# Patient Record
Sex: FEMALE | Race: WHITE | NOT HISPANIC OR LATINO | Employment: OTHER | URBAN - METROPOLITAN AREA
[De-identification: names, ages, dates, MRNs, and addresses within clinical notes are randomized per-mention and may not be internally consistent; named-entity substitution may affect disease eponyms.]

---

## 2022-12-05 ENCOUNTER — TELEPHONE (OUTPATIENT)
Dept: GASTROENTEROLOGY | Facility: CLINIC | Age: 32
End: 2022-12-05

## 2022-12-05 ENCOUNTER — OFFICE VISIT (OUTPATIENT)
Dept: GASTROENTEROLOGY | Facility: CLINIC | Age: 32
End: 2022-12-05
Payer: COMMERCIAL

## 2022-12-05 VITALS
SYSTOLIC BLOOD PRESSURE: 114 MMHG | OXYGEN SATURATION: 98 % | HEART RATE: 97 BPM | TEMPERATURE: 99 F | WEIGHT: 105.19 LBS | BODY MASS INDEX: 16.51 KG/M2 | DIASTOLIC BLOOD PRESSURE: 76 MMHG | HEIGHT: 67 IN

## 2022-12-05 DIAGNOSIS — R63.4 WEIGHT LOSS, UNINTENTIONAL: ICD-10-CM

## 2022-12-05 DIAGNOSIS — Z86.19 HISTORY OF HELICOBACTER PYLORI INFECTION: ICD-10-CM

## 2022-12-05 DIAGNOSIS — K59.04 CHRONIC IDIOPATHIC CONSTIPATION: Primary | ICD-10-CM

## 2022-12-05 DIAGNOSIS — Z80.0 FAMILY HISTORY OF COLON CANCER: ICD-10-CM

## 2022-12-05 DIAGNOSIS — R10.31 RIGHT LOWER QUADRANT PAIN: ICD-10-CM

## 2022-12-05 DIAGNOSIS — R11.0 NAUSEA: ICD-10-CM

## 2022-12-05 PROCEDURE — 1159F MED LIST DOCD IN RCRD: CPT | Mod: CPTII,S$GLB,, | Performed by: INTERNAL MEDICINE

## 2022-12-05 PROCEDURE — 1160F RVW MEDS BY RX/DR IN RCRD: CPT | Mod: CPTII,S$GLB,, | Performed by: INTERNAL MEDICINE

## 2022-12-05 PROCEDURE — 1160F PR REVIEW ALL MEDS BY PRESCRIBER/CLIN PHARMACIST DOCUMENTED: ICD-10-PCS | Mod: CPTII,S$GLB,, | Performed by: INTERNAL MEDICINE

## 2022-12-05 PROCEDURE — 3078F PR MOST RECENT DIASTOLIC BLOOD PRESSURE < 80 MM HG: ICD-10-PCS | Mod: CPTII,S$GLB,, | Performed by: INTERNAL MEDICINE

## 2022-12-05 PROCEDURE — 99204 PR OFFICE/OUTPT VISIT, NEW, LEVL IV, 45-59 MIN: ICD-10-PCS | Mod: S$GLB,,, | Performed by: INTERNAL MEDICINE

## 2022-12-05 PROCEDURE — 99204 OFFICE O/P NEW MOD 45 MIN: CPT | Mod: S$GLB,,, | Performed by: INTERNAL MEDICINE

## 2022-12-05 PROCEDURE — 3074F SYST BP LT 130 MM HG: CPT | Mod: CPTII,S$GLB,, | Performed by: INTERNAL MEDICINE

## 2022-12-05 PROCEDURE — 3008F PR BODY MASS INDEX (BMI) DOCUMENTED: ICD-10-PCS | Mod: CPTII,S$GLB,, | Performed by: INTERNAL MEDICINE

## 2022-12-05 PROCEDURE — 3074F PR MOST RECENT SYSTOLIC BLOOD PRESSURE < 130 MM HG: ICD-10-PCS | Mod: CPTII,S$GLB,, | Performed by: INTERNAL MEDICINE

## 2022-12-05 PROCEDURE — 1159F PR MEDICATION LIST DOCUMENTED IN MEDICAL RECORD: ICD-10-PCS | Mod: CPTII,S$GLB,, | Performed by: INTERNAL MEDICINE

## 2022-12-05 PROCEDURE — 3078F DIAST BP <80 MM HG: CPT | Mod: CPTII,S$GLB,, | Performed by: INTERNAL MEDICINE

## 2022-12-05 PROCEDURE — 3008F BODY MASS INDEX DOCD: CPT | Mod: CPTII,S$GLB,, | Performed by: INTERNAL MEDICINE

## 2022-12-05 RX ORDER — RISPERIDONE 2 MG/1
2 TABLET ORAL 2 TIMES DAILY
COMMUNITY
Start: 2022-09-29

## 2022-12-05 RX ORDER — SOD SULF/POT CHLORIDE/MAG SULF 1.479 G
12 TABLET ORAL DAILY
Qty: 24 TABLET | Refills: 0 | Status: SHIPPED | OUTPATIENT
Start: 2022-12-05 | End: 2023-07-17

## 2022-12-05 RX ORDER — DEXTROAMPHETAMINE SACCHARATE, AMPHETAMINE ASPARTATE, DEXTROAMPHETAMINE SULFATE AND AMPHETAMINE SULFATE 7.5; 7.5; 7.5; 7.5 MG/1; MG/1; MG/1; MG/1
30 TABLET ORAL 2 TIMES DAILY
COMMUNITY
Start: 2022-11-03

## 2022-12-05 NOTE — PROGRESS NOTES
Clinic Note    Reason for visit:  The primary encounter diagnosis was Chronic idiopathic constipation. Diagnoses of History of Helicobacter pylori infection, Weight loss, unintentional, Nausea, Right lower quadrant pain, and Family history of colon cancer were also pertinent to this visit.    PCP: Grady Cotton   [address] / [place] [address]    HPI:  This is a 32 y.o. female who is here to establish care. Patient reports chronic constipation as long as she can remember, may go 2-3 weeks without a BM. She will have a lot of lower abdominal pain if constipation worse. She has tried Miralax in past for constipation and then tried Linzess ?290 and states she will have a BM but seemed to stop working as well so she only takes as needed. Patient also reports intermittent BRBPR, but has to strain with BMs at times and attributes bleeding to hemorrhoids. She has had some weight loss since 2020 as well but right now is somewhat stable. She weighed around 126# in 2020, then went down to 98# and now up to 105. She has not changed her diet at all and is eating big portion of food. She states she had EGD many years ago and told had H. Pylori and did treatment for this (amox, Yury treated her, triple therapy, about 2015). Denies reflux, dysphagia or frequent NSAID use. Smokes half pack of cigarettes daily. She reports having intermittent nausea and fatigue since weight loss etc started. No vomiting. The nausea comes and goes, may not last a full day. No prior colonoscopy. No recent abd imaging. FH of colon cancer in paternal aunt at age 45, paternal uncle around age 46, and paternal grandfather.     From ed referral 3/2021 CT AP IV large amount of gastric contents in stomach     present during OV.    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  The in the process of trying to find a primary.  She has seen dermatology.  She is getting additional blood work to it.  Her  B12 was found to be above normal range and she does not take B12 supplements.  No prenatal vitamin.  No multivitamin.    Review of Systems   Constitutional:  Positive for chills, diaphoresis, fatigue and unexpected weight change. Negative for fever.   HENT:  Positive for postnasal drip. Negative for mouth sores, nosebleeds, sore throat, trouble swallowing and voice change.    Eyes:  Positive for pain and eye dryness. Negative for discharge.   Respiratory:  Negative for apnea, cough, choking, chest tightness, shortness of breath and wheezing.    Cardiovascular:  Negative for chest pain, palpitations, leg swelling and claudication.   Gastrointestinal:  Positive for abdominal distention, abdominal pain, anal bleeding, blood in stool, change in bowel habit, constipation, nausea, rectal pain and change in bowel habit. Negative for diarrhea, vomiting, reflux and fecal incontinence.   Genitourinary:  Negative for bladder incontinence, difficulty urinating, dysuria, flank pain, frequency and hematuria.   Musculoskeletal:  Positive for back pain. Negative for arthralgias, joint swelling and joint deformity.   Integumentary:  Negative for color change, rash and wound.   Allergic/Immunologic: Positive for environmental allergies. Negative for food allergies.   Neurological:  Positive for weakness and headaches. Negative for seizures, facial asymmetry, speech difficulty and memory loss.   Hematological:  Negative for adenopathy. Bruises/bleeds easily.   Psychiatric/Behavioral:  Positive for agitation. Negative for behavioral problems, confusion, hallucinations and sleep disturbance.       Past Medical History:   Diagnosis Date    ADHD     Chronic constipation     H. pylori infection     Melanoma     eye, attempting to find primary    Rectal bleed     Unspecified hemorrhoids     Weight loss      Past Surgical History:   Procedure Laterality Date    AUGMENTATION OF BREAST      TONSILLECTOMY AND ADENOIDECTOMY       Family History  "  Problem Relation Age of Onset    Stomach cancer Maternal Grandmother     Stomach cancer Maternal Grandfather     Stomach cancer Paternal Grandmother     Colon cancer Paternal Grandfather     Irritable bowel syndrome Maternal Uncle     Colon cancer Paternal Aunt 45    Colon cancer Paternal Uncle 46    Pancreatic cancer Neg Hx     Liver cancer Neg Hx     Liver disease Neg Hx     Esophageal cancer Neg Hx     Throat cancer Neg Hx     Ulcerative colitis Neg Hx     Crohn's disease Neg Hx      Social History     Tobacco Use    Smoking status: Every Day     Packs/day: 0.50     Types: Cigarettes    Smokeless tobacco: Never   Substance Use Topics    Alcohol use: Yes     Alcohol/week: 6.0 standard drinks     Types: 6 Cans of beer per week    Drug use: Never     Review of patient's allergies indicates:  No Known Allergies     Medication List with Changes/Refills   New Medications    SOD SULF-POT CHLORIDE-MAG SULF (SUTAB) 1.479-0.188- 0.225 GRAM TABLET    Take 12 tablets by mouth once daily. Take according to package instructions with indicated amount of water. No breakfast day before test. May substitute with Suprep, Clenpiq, Plenvu, Moviprep or GoLytely based on Rx plan and patient preference.   Current Medications    DEXTROAMPHETAMINE-AMPHETAMINE 30 MG TAB    Take 30 mg by mouth 2 (two) times a day.    RISPERIDONE (RISPERDAL) 2 MG TABLET    Take 2 mg by mouth 2 (two) times a day.         Vital Signs:  /76   Pulse 97   Temp 98.5 °F (36.9 °C)   Ht 5' 7" (1.702 m)   Wt 47.7 kg (105 lb 3.2 oz)   SpO2 98%   BMI 16.48 kg/m²         Physical Exam  Vitals reviewed.   Constitutional:       General: She is awake. She is not in acute distress.     Appearance: Normal appearance. She is well-developed. She is not ill-appearing, toxic-appearing or diaphoretic.   HENT:      Head: Normocephalic and atraumatic.      Nose: Nose normal.      Mouth/Throat:      Mouth: Mucous membranes are moist.      Pharynx: Oropharynx is clear. " No oropharyngeal exudate or posterior oropharyngeal erythema.   Eyes:      General: Lids are normal. Gaze aligned appropriately. No scleral icterus.        Right eye: No discharge.         Left eye: No discharge.      Extraocular Movements: Extraocular movements intact.      Conjunctiva/sclera: Conjunctivae normal.   Neck:      Trachea: Trachea normal.   Cardiovascular:      Rate and Rhythm: Normal rate and regular rhythm.      Pulses:           Radial pulses are 2+ on the right side and 2+ on the left side.   Pulmonary:      Effort: Pulmonary effort is normal. No respiratory distress.      Breath sounds: Normal breath sounds. No stridor. No wheezing or rhonchi.   Chest:      Chest wall: No tenderness.   Abdominal:      General: Bowel sounds are normal. There is no distension.      Palpations: Abdomen is soft. There is no fluid wave, hepatomegaly or mass.      Tenderness: There is no abdominal tenderness. There is no guarding or rebound.   Musculoskeletal:         General: No tenderness or deformity.      Cervical back: Full passive range of motion without pain and neck supple. No tenderness.      Right lower leg: No edema.      Left lower leg: No edema.   Lymphadenopathy:      Cervical: No cervical adenopathy.   Skin:     General: Skin is warm and dry.      Capillary Refill: Capillary refill takes less than 2 seconds.      Coloration: Skin is not cyanotic, jaundiced or pale.      Findings: No rash.   Neurological:      General: No focal deficit present.      Mental Status: She is alert and oriented to person, place, and time.      Cranial Nerves: No facial asymmetry.      Motor: No tremor.   Psychiatric:         Attention and Perception: Attention normal.         Mood and Affect: Mood and affect normal.         Speech: Speech normal.         Behavior: Behavior normal. Behavior is cooperative.          All of the data above and below has been reviewed by myself and any further interpretations will be reflected in  the assessment and plan.   The data includes review of external notes, and independent interpretation of lab results, procedures, x-rays, and imaging reports.      Assessment:  Chronic idiopathic constipation  -     Ambulatory Referral to External Surgery    History of Helicobacter pylori infection  -     Ambulatory Referral to External Surgery    Weight loss, unintentional  -     Ambulatory Referral to External Surgery    Nausea    Right lower quadrant pain    Family history of colon cancer    Other orders  -     sod sulf-pot chloride-mag sulf (SUTAB) 1.479-0.188- 0.225 gram tablet; Take 12 tablets by mouth once daily. Take according to package instructions with indicated amount of water. No breakfast day before test. May substitute with Suprep, Clenpiq, Plenvu, Moviprep or GoLytely based on Rx plan and patient preference.  Dispense: 24 tablet; Refill: 0    Plan for EGD/Colonoscopy with gastric biopsies to rule out H. Pylori.   Trial of Trulance. Samples given. Will focus on establishing good BM regimen to see if nausea improves as well.   10/2022 [facility].     Recommendations:  Schedule upper and lower endoscopies.   Begin taking Trulance 3 mg daily for constipation and notify us if this works well for you.     Risks, benefits, and alternatives of medical management, any associated procedures, and/or treatment discussed with the patient. Patient given opportunity to ask questions and voices understanding. Patient has elected to proceed with the recommended care modalities as discussed.    Follow up in about 6 months (around 6/5/2023).    Order summary:  Orders Placed This Encounter   Procedures    Ambulatory Referral to External Surgery          Instructed patient to notify my office if they have not been contacted within two weeks after any procedures, submitting any samples (biopsies, blood, stool, urine, etc.) or after any imaging (X-ray, CT, MRI, etc.).     Jennifer Garcia MD    This document may have been created  using a voice recognition transcribing system. Incorrect words or phrases may have been missed during proofreading. Please interpret accordingly or contact me for clarification.

## 2022-12-05 NOTE — LETTER
December 5, 2022        Grady Cotton MD  [facility] [address] [place] [address]             [facility]  [address] [place] [address]  Phone: [phone]  Fax: [phone]   Patient: Faiza Tony   MR Number: [id number]   YOB: 1990   Date of Visit: 12/5/2022       Dear Dr. Cotton:    Thank you for referring Faiza Tony to me for evaluation. Attached you will find relevant portions of my assessment and plan of care.    If you have questions, please do not hesitate to call me. I look forward to following Faiza Tony along with you.    Sincerely,      Jennifer Garcia MD            CC  No Recipients    Enclosure

## 2022-12-05 NOTE — PATIENT INSTRUCTIONS
Schedule upper and lower endoscopies.   Begin taking Trulance 3 mg daily for constipation and notify us if this works well for you.     Please notify my office if you have not been contacted within two weeks after any procedures, submitting any samples (biopsies, blood, stool, urine, etc.) or after any imaging (X-ray, CT, MRI, etc.).

## 2022-12-05 NOTE — LETTER
December 5, 2022        Grady Cotton MD  [address]  [address]  [facility]  [place] [address]             [facility]  [address] [place] [address]  Phone: [phone]  Fax: [phone]   Patient: Faiza Tony   MR Number: [id number]   YOB: 1990   Date of Visit: 12/5/2022       Dear Dr. Cotton:    Thank you for referring Faiza Tony to me for evaluation. Attached you will find relevant portions of my assessment and plan of care.    If you have questions, please do not hesitate to call me. I look forward to following Faiza Tony along with you.    Sincerely,      Jennifer Garcia MD            CC  No Recipients    Enclosure

## 2022-12-05 NOTE — TELEPHONE ENCOUNTER
"[facility]  [address] [place] [address]  Phone: [phone]  Fax: [phone]    History & Physical         Provider: Dr. Jennifer Garcia    Patient Name: Faiza MOREL (age):1990  32 y.o.           Gender: female   Phone: [phone]     Referring Physician: Grady Cotton     Vital Signs:   Height - 5' 7"  Weight - 105 lbs  BMI -  16.45    Plan: EGD/Colonoscopy    Encounter Diagnoses   Name Primary?    Chronic idiopathic constipation Yes    History of Helicobacter pylori infection     Weight loss, unintentional            History:      Past Medical History:   Diagnosis Date    ADHD     Chronic constipation     H. pylori infection     Melanoma     eye, attempting to find primary    Rectal bleed     Unspecified hemorrhoids     Weight loss       Past Surgical History:   Procedure Laterality Date    AUGMENTATION OF BREAST      TONSILLECTOMY AND ADENOIDECTOMY        Medication List with Changes/Refills   Current Medications    DEXTROAMPHETAMINE-AMPHETAMINE 30 MG TAB    Take 30 mg by mouth 2 (two) times a day.    RISPERIDONE (RISPERDAL) 2 MG TABLET    Take 2 mg by mouth 2 (two) times a day.    SOD SULF-POT CHLORIDE-MAG SULF (SUTAB) 1.479-0.188- 0.225 GRAM TABLET    Take 12 tablets by mouth once daily. Take according to package instructions with indicated amount of water. No breakfast day before test. May substitute with Suprep, Clenpiq, Plenvu, Moviprep or GoLytely based on Rx plan and patient preference.      Review of patient's allergies indicates:  No Known Allergies   Family History   Problem Relation Age of Onset    Stomach cancer Maternal Grandmother     Stomach cancer Maternal Grandfather     Stomach cancer Paternal Grandmother     Colon cancer Paternal Grandfather     Irritable bowel syndrome Maternal Uncle     Colon cancer Paternal Aunt 45    Colon cancer Paternal Uncle 46    Pancreatic " cancer Neg Hx     Liver cancer Neg Hx     Liver disease Neg Hx     Esophageal cancer Neg Hx     Throat cancer Neg Hx     Ulcerative colitis Neg Hx     Crohn's disease Neg Hx       Social History     Tobacco Use    Smoking status: Every Day     Packs/day: 0.50     Types: Cigarettes    Smokeless tobacco: Never   Substance Use Topics    Alcohol use: Yes     Alcohol/week: 6.0 standard drinks     Types: 6 Cans of beer per week    Drug use: Never        Physical Examination:     General Appearance:___________________________  HEENT: _____________________________________  Abdomen:____________________________________  Heart:________________________________________  Lungs:_______________________________________  Extremities:___________________________________  Skin:_________________________________________  Endocrine:____________________________________  Genitourinary:_________________________________  Neurological:__________________________________      Patient has been evaluated immediately prior to sedation and is medically cleared for endoscopy with IVCS as an ASA class: ______      Physician Signature: _________________________       Date: ________  Time: ________

## 2023-02-27 ENCOUNTER — TELEPHONE (OUTPATIENT)
Dept: ADMINISTRATIVE | Facility: CLINIC | Age: 33
End: 2023-02-27
Payer: COMMERCIAL

## 2023-03-06 ENCOUNTER — OUTSIDE PLACE OF SERVICE (OUTPATIENT)
Dept: GASTROENTEROLOGY | Facility: CLINIC | Age: 33
End: 2023-03-06
Payer: COMMERCIAL

## 2023-03-06 LAB — CRC RECOMMENDATION EXT: NORMAL

## 2023-03-06 PROCEDURE — 45378 DIAGNOSTIC COLONOSCOPY: CPT | Mod: ,,, | Performed by: INTERNAL MEDICINE

## 2023-03-06 PROCEDURE — 45378 PR COLONOSCOPY,DIAGNOSTIC: ICD-10-PCS | Mod: ,,, | Performed by: INTERNAL MEDICINE

## 2023-03-06 PROCEDURE — 43239 PR EGD, FLEX, W/BIOPSY, SGL/MULTI: ICD-10-PCS | Mod: 51,,, | Performed by: INTERNAL MEDICINE

## 2023-03-06 PROCEDURE — 43239 EGD BIOPSY SINGLE/MULTIPLE: CPT | Mod: 51,,, | Performed by: INTERNAL MEDICINE

## 2023-03-12 ENCOUNTER — TELEPHONE (OUTPATIENT)
Dept: GASTROENTEROLOGY | Facility: CLINIC | Age: 33
End: 2023-03-12
Payer: COMMERCIAL

## 2023-03-12 DIAGNOSIS — K29.80 DUODENITIS DETERMINED BY BIOPSY: Primary | ICD-10-CM

## 2023-03-12 NOTE — TELEPHONE ENCOUNTER
DBx patchy mild chr ditis, GBx react w/o Hp, GEBx reflux, repeat colonoscopy in 5 years.     Update in Health Maintenance section of Epic.  Notify patient. No signs of infection or precancerous cells on the upper endoscopy biopsies.  No. H. Pylori.  Prior CT AP IV looked well.  Did the Trulance samples given during her OV work well for her? Rec GES if having any persistent GI Sx.  NBP

## 2023-03-12 NOTE — TELEPHONE ENCOUNTER
She also had some mild inflammation of the small bowel. Rec we check her blood work for signs of Celiac disease. Orders signed.  NBP

## 2023-03-14 ENCOUNTER — TELEPHONE (OUTPATIENT)
Dept: GASTROENTEROLOGY | Facility: CLINIC | Age: 33
End: 2023-03-14
Payer: COMMERCIAL

## 2023-03-14 NOTE — TELEPHONE ENCOUNTER
I spoke to patient and gave results and is going to get blood work done at the path lab in [place].     ----- Message from Terra Mas sent at 3/14/2023  9:21 AM CDT -----  Contact: self  Type:  Patient Returning Call    Who Called: Faiza Tony   Who Left Message for Patient: Mariaa   Does the patient know what this is regarding?: Yes, results   Would the patient rather a call back or a response via [facility]? Call back   Best Call Back Number:[phone]   Additional Information: No answer at extensions

## 2023-03-15 NOTE — TELEPHONE ENCOUNTER
March 14, 2023  Margot Borrego LPN     [place]     9:48 AM  Note  I spoke to patient and gave results and is going to get blood work done at the path lab in [place].     ----- Message from Terra Mas sent at 3/14/2023  9:21 AM CDT -----  Contact: self  Type:  Patient Returning Call     Who Called: Faiza Tony   Who Left Message for Patient: Mariaa   Does the patient know what this is regarding?: Yes, results   Would the patient rather a call back or a response via [facility]? Call back   Best Call Back Number:[phone]

## 2023-04-26 ENCOUNTER — DOCUMENTATION ONLY (OUTPATIENT)
Dept: GASTROENTEROLOGY | Facility: CLINIC | Age: 33
End: 2023-04-26
Payer: COMMERCIAL

## 2023-07-17 ENCOUNTER — OFFICE VISIT (OUTPATIENT)
Dept: GASTROENTEROLOGY | Facility: CLINIC | Age: 33
End: 2023-07-17
Payer: COMMERCIAL

## 2023-07-17 ENCOUNTER — TELEPHONE (OUTPATIENT)
Dept: GASTROENTEROLOGY | Facility: CLINIC | Age: 33
End: 2023-07-17

## 2023-07-17 VITALS
DIASTOLIC BLOOD PRESSURE: 71 MMHG | HEIGHT: 67 IN | BODY MASS INDEX: 16.95 KG/M2 | WEIGHT: 108 LBS | OXYGEN SATURATION: 99 % | HEART RATE: 72 BPM | SYSTOLIC BLOOD PRESSURE: 116 MMHG

## 2023-07-17 DIAGNOSIS — K29.80 DUODENITIS DETERMINED BY BIOPSY: ICD-10-CM

## 2023-07-17 DIAGNOSIS — Z80.0 FAMILY HISTORY OF COLON CANCER: ICD-10-CM

## 2023-07-17 DIAGNOSIS — Z86.19 HISTORY OF HELICOBACTER PYLORI INFECTION: ICD-10-CM

## 2023-07-17 DIAGNOSIS — R11.0 NAUSEA: ICD-10-CM

## 2023-07-17 DIAGNOSIS — K59.04 CHRONIC IDIOPATHIC CONSTIPATION: Primary | ICD-10-CM

## 2023-07-17 PROCEDURE — 1159F MED LIST DOCD IN RCRD: CPT | Mod: CPTII,S$GLB,, | Performed by: INTERNAL MEDICINE

## 2023-07-17 PROCEDURE — 99213 OFFICE O/P EST LOW 20 MIN: CPT | Mod: S$GLB,,, | Performed by: INTERNAL MEDICINE

## 2023-07-17 PROCEDURE — 3008F BODY MASS INDEX DOCD: CPT | Mod: CPTII,S$GLB,, | Performed by: INTERNAL MEDICINE

## 2023-07-17 PROCEDURE — 3078F DIAST BP <80 MM HG: CPT | Mod: CPTII,S$GLB,, | Performed by: INTERNAL MEDICINE

## 2023-07-17 PROCEDURE — 1160F RVW MEDS BY RX/DR IN RCRD: CPT | Mod: CPTII,S$GLB,, | Performed by: INTERNAL MEDICINE

## 2023-07-17 PROCEDURE — 3074F PR MOST RECENT SYSTOLIC BLOOD PRESSURE < 130 MM HG: ICD-10-PCS | Mod: CPTII,S$GLB,, | Performed by: INTERNAL MEDICINE

## 2023-07-17 PROCEDURE — 3074F SYST BP LT 130 MM HG: CPT | Mod: CPTII,S$GLB,, | Performed by: INTERNAL MEDICINE

## 2023-07-17 PROCEDURE — 3008F PR BODY MASS INDEX (BMI) DOCUMENTED: ICD-10-PCS | Mod: CPTII,S$GLB,, | Performed by: INTERNAL MEDICINE

## 2023-07-17 PROCEDURE — 3078F PR MOST RECENT DIASTOLIC BLOOD PRESSURE < 80 MM HG: ICD-10-PCS | Mod: CPTII,S$GLB,, | Performed by: INTERNAL MEDICINE

## 2023-07-17 PROCEDURE — 99213 PR OFFICE/OUTPT VISIT, EST, LEVL III, 20-29 MIN: ICD-10-PCS | Mod: S$GLB,,, | Performed by: INTERNAL MEDICINE

## 2023-07-17 PROCEDURE — 1159F PR MEDICATION LIST DOCUMENTED IN MEDICAL RECORD: ICD-10-PCS | Mod: CPTII,S$GLB,, | Performed by: INTERNAL MEDICINE

## 2023-07-17 PROCEDURE — 1160F PR REVIEW ALL MEDS BY PRESCRIBER/CLIN PHARMACIST DOCUMENTED: ICD-10-PCS | Mod: CPTII,S$GLB,, | Performed by: INTERNAL MEDICINE

## 2023-07-17 RX ORDER — PLECANATIDE 3 MG/1
3 TABLET ORAL DAILY
Qty: 90 TABLET | Refills: 4 | Status: SHIPPED | OUTPATIENT
Start: 2023-07-17

## 2023-07-17 NOTE — LETTER
July 17, 2023        Grady Cotton MD  [address] [facility] [address]  [address]  [facility]  [place] [address]             [facility]  [address] [place] [address]  Phone: [phone]  Fax: [phone]   Patient: Faiza Tony   MR Number: [id number]   YOB: 1990   Date of Visit: 7/17/2023       Dear Dr. Cotton:    Thank you for referring Faiza Tony to me for evaluation. Attached you will find relevant portions of my assessment and plan of care.    If you have questions, please do not hesitate to call me. I look forward to following Faiza Tony along with you.    Sincerely,      Jennifer Garcia MD            CC  No Recipients    Enclosure

## 2023-07-17 NOTE — PATIENT INSTRUCTIONS
Take Trulance 3 mg daily.     Please notify my office if you have not been contacted within two weeks after any procedures, submitting any samples (biopsies, blood, stool, urine, etc.) or after any imaging (X-ray, CT, MRI, etc.).

## 2023-07-17 NOTE — PROGRESS NOTES
Clinic Note    Reason for visit:  The primary encounter diagnosis was Chronic idiopathic constipation. Diagnoses of Duodenitis determined by biopsy, Nausea, Family history of colon cancer, and History of Helicobacter pylori infection were also pertinent to this visit.    PCP: Grady Cotton       HPI:  This is a 33 y.o. female who is here for a follow up. Was to have Celiac blood work in 3/2023. She was given samples of Trulance. Before Trulance, was having BM every 2-3 weeks but with Trulance going regularly, every 1-2 days. She is out of the medication now. When taking Trulance she did not have as much abdominal pain. She believes her nausea was from Chantix. She still has intermittent nausea, but not like before. She did Celiac labs in 3/2023 at Dr. Cotton's office but we do not have these results. Has gained 3 pounds since OV 12/2022.         EGD/Colonoscopy 3/6/2023: Small HH, Tortuous, redundant colon. Small IH. Normal mucosa of whole colon. DBx patchy mild chr ditis, GBx react w/o Hp, GEBx reflux, repeat colonoscopy in 5 years. Prior CT AP IV looked well.        Review of Systems   Constitutional:  Negative for fatigue, fever and unexpected weight change.   HENT:  Positive for postnasal drip and sore throat. Negative for mouth sores and trouble swallowing.    Eyes:  Positive for pain and eye dryness. Negative for discharge.   Respiratory:  Negative for apnea, cough, choking, chest tightness, shortness of breath and wheezing.    Cardiovascular:  Negative for chest pain, palpitations and leg swelling.   Gastrointestinal:  Positive for abdominal pain, constipation and nausea. Negative for abdominal distention, anal bleeding, blood in stool, change in bowel habit, diarrhea, rectal pain, vomiting, reflux, fecal incontinence and change in bowel habit.   Genitourinary:  Negative for bladder incontinence, dysuria and hematuria.   Musculoskeletal:  Positive for back pain. Negative for arthralgias and joint swelling.    Integumentary:  Negative for color change and rash.   Allergic/Immunologic: Positive for environmental allergies and food allergies.   Neurological:  Positive for headaches. Negative for seizures.   Hematological:  Negative for adenopathy. Bruises/bleeds easily.      Past Medical History:   Diagnosis Date    ADHD     Chronic constipation     H. pylori infection     Melanoma     eye, attempting to find primary    Rectal bleed     Unspecified hemorrhoids      Past Surgical History:   Procedure Laterality Date    AUGMENTATION OF BREAST      TONSILLECTOMY AND ADENOIDECTOMY       Family History   Problem Relation Age of Onset    Irritable bowel syndrome Maternal Uncle     Colon cancer Paternal Aunt 45    Colon cancer Paternal Uncle 46    Stomach cancer Maternal Grandmother     Stomach cancer Maternal Grandfather     Pancreatic cancer Paternal Grandmother     Stomach cancer Paternal Grandmother     Colon cancer Paternal Grandfather     Liver cancer Neg Hx     Liver disease Neg Hx     Esophageal cancer Neg Hx     Throat cancer Neg Hx     Ulcerative colitis Neg Hx     Crohn's disease Neg Hx      Social History     Tobacco Use    Smoking status: Every Day     Packs/day: 0.50     Types: Cigarettes    Smokeless tobacco: Never   Substance Use Topics    Alcohol use: Yes     Alcohol/week: 6.0 standard drinks     Types: 6 Cans of beer per week    Drug use: Never     Review of patient's allergies indicates:  No Known Allergies     Medication List with Changes/Refills   New Medications    PLECANATIDE (TRULANCE) 3 MG TAB    Take 3 mg by mouth once daily.   Current Medications    DEXTROAMPHETAMINE-AMPHETAMINE 30 MG TAB    Take 30 mg by mouth 2 (two) times a day.    RISPERIDONE (RISPERDAL) 2 MG TABLET    Take 2 mg by mouth 2 (two) times a day.   Discontinued Medications    SOD SULF-POT CHLORIDE-MAG SULF (SUTAB) 1.479-0.188- 0.225 GRAM TABLET    Take 12 tablets by mouth once daily. Take according to package instructions with indicated  "amount of water. No breakfast day before test. May substitute with Suprep, Clenpiq, Plenvu, Moviprep or GoLytely based on Rx plan and patient preference.         Vital Signs:  /71   Pulse 72   Ht 5' 7" (1.702 m)   Wt 49 kg (108 lb)   SpO2 99%   BMI 16.92 kg/m²         Physical Exam  Vitals reviewed.   Constitutional:       General: She is awake. She is not in acute distress.     Appearance: Normal appearance. She is well-developed. She is not ill-appearing, toxic-appearing or diaphoretic.   HENT:      Head: Normocephalic and atraumatic.      Nose: Nose normal.      Mouth/Throat:      Mouth: Mucous membranes are moist.      Pharynx: Oropharynx is clear. No oropharyngeal exudate or posterior oropharyngeal erythema.   Eyes:      General: Lids are normal. Gaze aligned appropriately. No scleral icterus.        Right eye: No discharge.         Left eye: No discharge.      Conjunctiva/sclera: Conjunctivae normal.   Neck:      Trachea: Trachea normal.   Cardiovascular:      Rate and Rhythm: Normal rate and regular rhythm.      Pulses:           Radial pulses are 2+ on the right side and 2+ on the left side.   Pulmonary:      Effort: Pulmonary effort is normal. No respiratory distress.      Breath sounds: No stridor. No wheezing.   Chest:      Chest wall: No tenderness.   Abdominal:      General: Bowel sounds are normal. There is no distension.      Palpations: Abdomen is soft. There is no fluid wave, hepatomegaly or mass.      Tenderness: There is no abdominal tenderness. There is no guarding or rebound.   Musculoskeletal:         General: No tenderness or deformity.      Cervical back: Full passive range of motion without pain and neck supple. No tenderness.      Right lower leg: No edema.      Left lower leg: No edema.   Lymphadenopathy:      Cervical: No cervical adenopathy.   Skin:     General: Skin is warm and dry.      Capillary Refill: Capillary refill takes less than 2 seconds.      Coloration: Skin is " not cyanotic, jaundiced or pale.   Neurological:      General: No focal deficit present.      Mental Status: She is alert and oriented to person, place, and time.      Motor: No tremor.   Psychiatric:         Attention and Perception: Attention normal.         Mood and Affect: Mood and affect normal.         Speech: Speech normal.         Behavior: Behavior normal. Behavior is cooperative.          All of the data above and below has been reviewed by myself and any further interpretations will be reflected in the assessment and plan.   The data includes review of external notes, and independent interpretation of lab results, procedures, x-rays, and imaging reports.      Assessment:  Chronic idiopathic constipation  Comments:  Trulance helps  Orders:  -     plecanatide (TRULANCE) 3 mg Tab; Take 3 mg by mouth once daily.  Dispense: 90 tablet; Refill: 4    Duodenitis determined by biopsy    Nausea  Comments:  Improved with stopping Chantix    Family history of colon cancer  Comments:  colon due 2028    History of Helicobacter pylori infection    Will review Celiac labs from Dr. Cotton's office once received.  Discussed GES. She states her GI symptoms are improved and does not feel she needs this right now.     Recommendations:  Take Trulance 3 mg daily.     Risks, benefits, and alternatives of medical management, any associated procedures, and/or treatment discussed with the patient. Patient given opportunity to ask questions and voices understanding. Patient has elected to proceed with the recommended care modalities as discussed.    Instructed patient to notify my office if they have not been contacted within two weeks after any procedures, submitting any samples (biopsies, blood, stool, urine, etc.) or after any imaging (X-ray, CT, MRI, etc.).     Follow up in about 1 year (around 7/17/2024).    Order summary:  No orders of the defined types were placed in this encounter.         This document may have been created  using a voice recognition transcribing system. Incorrect words or phrases may have been missed during proofreading. Please interpret accordingly or contact me for clarification.     Jennifer Garcia MD

## 2023-07-19 ENCOUNTER — TELEPHONE (OUTPATIENT)
Dept: GASTROENTEROLOGY | Facility: CLINIC | Age: 33
End: 2023-07-19
Payer: COMMERCIAL

## 2023-07-19 NOTE — TELEPHONE ENCOUNTER
----- Message from Trinh Tse sent at 7/19/2023  9:28 AM CDT -----  Patient is returning call for procedure results please call her back at [phone]

## 2023-07-27 DIAGNOSIS — K59.04 CHRONIC IDIOPATHIC CONSTIPATION: ICD-10-CM

## 2023-07-27 RX ORDER — PLECANATIDE 3 MG/1
3 TABLET ORAL DAILY
Qty: 90 TABLET | Refills: 4 | OUTPATIENT
Start: 2023-07-27

## 2025-07-09 ENCOUNTER — TELEPHONE (OUTPATIENT)
Dept: GASTROENTEROLOGY | Facility: CLINIC | Age: 35
End: 2025-07-09

## 2025-07-09 NOTE — TELEPHONE ENCOUNTER
Patient having hemorrhoid problems, PCP rx hydrocortisone cream and suppositories, scheduled 7/14 @940 w/ NP Riya.

## 2025-07-09 NOTE — TELEPHONE ENCOUNTER
Copied from CRM #[id number]. Topic: General Inquiry - Return Call  >> Jul 8, 2025  4:16 PM Sabrina wrote:  Type:  Patient Requesting Call    Who Called:Faiza Tony  Does the patient know what this is regarding?: pt is calling to schedule appt, est care  Would the patient rather a call back or a response via [employer]? call  Best Call Back Number: [phone]    Additional Information: pt is having hemorrhoids, and in pain

## 2025-07-11 RX ORDER — DIAZEPAM 10 MG/1
TABLET ORAL
COMMUNITY
Start: 2025-03-03

## 2025-07-11 RX ORDER — MONTELUKAST SODIUM 10 MG/1
10 TABLET ORAL
COMMUNITY
Start: 2025-03-03

## 2025-07-11 NOTE — PROGRESS NOTES
Clinic Note    Reason for visit:  The primary encounter diagnosis was Hemorrhoids, unspecified hemorrhoid type. Diagnoses of Chronic idiopathic constipation, History of Helicobacter pylori infection, Family history of colon cancer, Rectal pain, and BRBPR (bright red blood per rectum) were also pertinent to this visit.    PCP: Grady Cotton       HPI:  This is a 35 y.o. female who was last seen by Dr. Garcia in 2023. Patient has chronic constipation. She is complaining of issues with hemorrhoids. PCP gave her hydrocortisone suppositories. She was previously taking Trulance 3 mg daily but could no longer afford it. She is taking Linzess 145 mcg daily from her father in law. Having BM regularly with this. Had to take 3 of them recently to have a BM. She has had hemorrhoids which have been prolapsed and causing severe pain. Tried pushing back in. Taking sitz baths, using hydrocortisone cream/suppositories without relief.  She has had some BRB with wiping.   Has also started taking Metamucil. Has been taking ibuprofen for pain.     Last OV note: She was given samples of Trulance. Before Trulance, was having BM every 2-3 weeks but with Trulance going regularly, every 1-2 days. When taking Trulance she did not have as much abdominal pain.      6/2023: Celiac neg.    EGD/Colonoscopy 3/6/2023: Small HH, Tortuous, redundant colon. Small IH. Normal mucosa of whole colon/TI. DBx patchy mild chr ditis, GBx react w/o Hp, GEBx reflux, repeat colonoscopy in 5 years.     3/2021 CT AP IV large amount of gastric contents in stomach     Review of Systems   Constitutional:  Negative for fatigue, fever and unexpected weight change.   HENT:  Negative for mouth sores, postnasal drip, sore throat and trouble swallowing.    Eyes:  Negative for pain, discharge and eye dryness.   Respiratory:  Negative for apnea, cough, choking, chest tightness, shortness of breath and wheezing.    Cardiovascular:  Negative for chest pain, palpitations and  leg swelling.   Gastrointestinal:  Positive for constipation and rectal pain. Negative for abdominal distention, abdominal pain, anal bleeding, blood in stool, change in bowel habit, diarrhea, nausea, vomiting, reflux and fecal incontinence.   Genitourinary:  Negative for bladder incontinence, dysuria and hematuria.   Musculoskeletal:  Negative for arthralgias, back pain and joint swelling.   Integumentary:  Negative for color change and rash.   Allergic/Immunologic: Negative for environmental allergies and food allergies.   Neurological:  Negative for seizures and headaches.   Hematological:  Negative for adenopathy. Does not bruise/bleed easily.        Past Medical History:   Diagnosis Date    ADHD     BMI < 18.5 07/14/2025    Chronic constipation     History of Helicobacter pylori infection     Melanoma     eye    Unspecified hemorrhoids      Past Surgical History:   Procedure Laterality Date    AUGMENTATION OF BREAST      TONSILLECTOMY AND ADENOIDECTOMY       Family History   Problem Relation Name Age of Onset    Irritable bowel syndrome Maternal Uncle      Colon cancer Paternal Aunt  45    Colon cancer Paternal Uncle  46    Stomach cancer Maternal Grandmother      Stomach cancer Maternal Grandfather      Pancreatic cancer Paternal Grandmother      Stomach cancer Paternal Grandmother      Colon cancer Paternal Grandfather      Liver cancer Neg Hx      Liver disease Neg Hx      Esophageal cancer Neg Hx      Throat cancer Neg Hx      Ulcerative colitis Neg Hx      Crohn's disease Neg Hx       Social History[1]  Review of patient's allergies indicates:  No Known Allergies   Medication List with Changes/Refills   New Medications    PLECANATIDE (TRULANCE) 3 MG TAB    Take 1 tablet (3 mg total) by mouth once daily.    PLECANATIDE (TRULANCE) 3 MG TAB    Take 1 tablet (3 mg total) by mouth once daily.   Current Medications    DEXTROAMPHETAMINE-AMPHETAMINE 30 MG TAB    Take 30 mg by mouth 2 (two) times a day.    DIAZEPAM  "(VALIUM) 10 MG TAB    TAKE 1 TABLET ONCE DAILY ASNEEDED FOR ANXIETY    HYDROCORTISONE (ANUSOL-HC) 25 MG SUPPOSITORY    INSERT 1 SUPPOSITORY INTO RECTUM TWICE A DAY FOR 1 TO 2 WEEKS FOR FLAIR UPS.    MONTELUKAST (SINGULAIR) 10 MG TABLET    Take 10 mg by mouth.    TIZANIDINE (ZANAFLEX) 4 MG TABLET    TAKE 1 TABLET 3 TIMES A DAYAS NEEDED FOR MUSCLE SPASMS   Discontinued Medications    PLECANATIDE (TRULANCE) 3 MG TAB    Take 3 mg by mouth once daily.    RISPERIDONE (RISPERDAL) 2 MG TABLET    Take 2 mg by mouth 2 (two) times a day.         Vital Signs:  /83 (BP Location: Left arm, Patient Position: Sitting)   Pulse 78   Ht 5' 7" (1.702 m)   Wt 46.4 kg (102 lb 3.2 oz)   SpO2 98%   BMI 16.01 kg/m²        Physical Exam  Vitals reviewed. Exam conducted with a chaperone present.   Constitutional:       General: She is awake. She is not in acute distress.     Appearance: Normal appearance. She is well-developed and underweight. She is not ill-appearing, toxic-appearing or diaphoretic.   HENT:      Head: Normocephalic and atraumatic.      Nose: Nose normal.      Mouth/Throat:      Mouth: Mucous membranes are moist.      Pharynx: Oropharynx is clear. No oropharyngeal exudate or posterior oropharyngeal erythema.   Eyes:      General: Lids are normal. Gaze aligned appropriately. No scleral icterus.        Right eye: No discharge.         Left eye: No discharge.      Conjunctiva/sclera: Conjunctivae normal.   Neck:      Trachea: Trachea normal.   Cardiovascular:      Rate and Rhythm: Normal rate and regular rhythm.      Pulses:           Radial pulses are 2+ on the right side and 2+ on the left side.   Pulmonary:      Effort: Pulmonary effort is normal. No respiratory distress.      Breath sounds: No stridor. No wheezing.   Chest:      Chest wall: No tenderness.   Abdominal:      General: Bowel sounds are normal. There is no distension.      Palpations: Abdomen is soft. There is no fluid wave, hepatomegaly or mass.      " Tenderness: There is no abdominal tenderness. There is no guarding or rebound.   Genitourinary:     Comments: Perianal exam with prolapsed hemorrhoid. Tender to touch. She declined BAM due to discomfort.     Musculoskeletal:         General: No tenderness or deformity.      Cervical back: No tenderness.      Right lower leg: No edema.      Left lower leg: No edema.   Lymphadenopathy:      Cervical: No cervical adenopathy.   Skin:     General: Skin is warm and dry.      Capillary Refill: Capillary refill takes less than 2 seconds.      Coloration: Skin is not cyanotic, jaundiced or pale.   Neurological:      General: No focal deficit present.      Mental Status: She is alert and oriented to person, place, and time.      Motor: No tremor.   Psychiatric:         Attention and Perception: Attention normal.         Mood and Affect: Mood and affect normal.         Speech: Speech normal.         Behavior: Behavior normal. Behavior is cooperative.            All of the data above and below has been reviewed by myself and any further interpretations will be reflected in the assessment and plan.   The data includes review of external notes, and independent interpretation of lab results, procedures, x-rays, and imaging reports.      Assessment:  Hemorrhoids, unspecified hemorrhoid type  -     Ambulatory referral/consult to General Surgery; Future; Expected date: 07/21/2025    Chronic idiopathic constipation  -     plecanatide (TRULANCE) 3 mg Tab; Take 1 tablet (3 mg total) by mouth once daily.  Dispense: 90 tablet; Refill: 1    History of Helicobacter pylori infection    Family history of colon cancer  -     Ambulatory Referral to External Surgery    Rectal pain  -     Ambulatory Referral to External Surgery  -     Ambulatory referral/consult to General Surgery; Future; Expected date: 07/21/2025    BRBPR (bright red blood per rectum)  -     Ambulatory Referral to External Surgery    Other orders  -     plecanatide (TRULANCE) 3  mg Tab; Take 1 tablet (3 mg total) by mouth once daily.  Dispense: 15 tablet; Refill: 0      Constipation, previously on Trulance. Could no longer afford but got new insurance. Will see if needs PA. Will give samples. Taking Linzess 145 currently and does help BMs. Discussed importance of good BM regimen with daily, easy to pass BMs to prevent issues with hemorrhoids.   Hemorrhoids with pain and intermittent BRBPR. Has been trying conservative management with sitz baths, hydrocortisone suppositories/cream. Will refer to gen surgeon given her pain. Will set up for colonoscopy as well given her FH of colon cancer. Pain and bleeding likely all due to hemorrhoids but will set up as back up.  FH of colon cancer. Colon due 2028.     Recommendations:    Begin taking Trulance 3 mg daily.   We will send referral to Dr. Hodgson for hemorrhoids.  Schedule colonoscopy with Dr. Garcia.     If any tests, procedures, or imaging has been ordered and you are not contacted to schedule within 1-2 weeks, then you may call the central scheduling department directly at [phone].     Risks, benefits, and alternatives of medical management, any associated procedures, and/or treatment discussed with the patient. Patient given opportunity to ask questions and voices understanding. Patient has elected to proceed with the recommended care modalities as discussed.    Follow up in about 1 year (around 7/14/2026).    Order summary:  Orders Placed This Encounter   Procedures    Ambulatory Referral to External Surgery    Ambulatory referral/consult to General Surgery          Instructed patient to notify my office if they have not been contacted within two weeks after any procedures, submitting any samples (biopsies, blood, stool, urine, etc.) or after any imaging (X-ray, CT, MRI, etc.).      Riya Albarran NP    This document may have been created using a voice recognition transcribing system. Incorrect words or phrases may have been missed  during proofreading. Please interpret accordingly or contact me for clarification.          [1]   Social History  Tobacco Use    Smoking status: Every Day     Current packs/day: 0.50     Types: Cigarettes    Smokeless tobacco: Never   Substance Use Topics    Alcohol use: Yes     Alcohol/week: 6.0 standard drinks of alcohol     Types: 6 Cans of beer per week    Drug use: Never

## 2025-07-14 ENCOUNTER — OFFICE VISIT (OUTPATIENT)
Dept: GASTROENTEROLOGY | Facility: CLINIC | Age: 35
End: 2025-07-14
Payer: COMMERCIAL

## 2025-07-14 ENCOUNTER — TELEPHONE (OUTPATIENT)
Dept: GASTROENTEROLOGY | Facility: CLINIC | Age: 35
End: 2025-07-14

## 2025-07-14 VITALS
WEIGHT: 102.19 LBS | OXYGEN SATURATION: 98 % | SYSTOLIC BLOOD PRESSURE: 123 MMHG | HEART RATE: 78 BPM | BODY MASS INDEX: 16.04 KG/M2 | DIASTOLIC BLOOD PRESSURE: 83 MMHG | HEIGHT: 67 IN

## 2025-07-14 DIAGNOSIS — K59.04 CHRONIC IDIOPATHIC CONSTIPATION: ICD-10-CM

## 2025-07-14 DIAGNOSIS — K64.9 HEMORRHOIDS, UNSPECIFIED HEMORRHOID TYPE: Primary | ICD-10-CM

## 2025-07-14 DIAGNOSIS — Z80.0 FAMILY HISTORY OF COLON CANCER: ICD-10-CM

## 2025-07-14 DIAGNOSIS — K62.5 BRBPR (BRIGHT RED BLOOD PER RECTUM): ICD-10-CM

## 2025-07-14 DIAGNOSIS — K62.89 RECTAL PAIN: ICD-10-CM

## 2025-07-14 DIAGNOSIS — Z86.19 HISTORY OF HELICOBACTER PYLORI INFECTION: ICD-10-CM

## 2025-07-14 PROCEDURE — 99215 OFFICE O/P EST HI 40 MIN: CPT | Mod: S$PBB,,,

## 2025-07-14 RX ORDER — PLECANATIDE 3 MG/1
3 TABLET ORAL DAILY
Qty: 90 TABLET | Refills: 1 | Status: SHIPPED | OUTPATIENT
Start: 2025-07-14

## 2025-07-14 RX ORDER — HYDROCORTISONE ACETATE 25 MG/1
SUPPOSITORY RECTAL
COMMUNITY
Start: 2025-07-08

## 2025-07-14 RX ORDER — PLECANATIDE 3 MG/1
3 TABLET ORAL DAILY
Qty: 15 TABLET | Refills: 0 | COMMUNITY
Start: 2025-07-14

## 2025-07-14 RX ORDER — TIZANIDINE 4 MG/1
TABLET ORAL
COMMUNITY
Start: 2025-07-09

## 2025-07-14 NOTE — TELEPHONE ENCOUNTER
Patient was given instructions and they were reviewed with patient. Patient was given sutab coupon. Patient was given AVS with apt details. Patient order was faxed to central scheduling at [facility]. No pa required. LRA 7/14/25

## 2025-07-14 NOTE — TELEPHONE ENCOUNTER
PA Request for Trulance 3 mg was sent via EP[employer]. Completed and sent to plan via [facility]. Answered follow up questions and uploaded supporting documents. Request was APPROVED  07/14/2025 THRU 07/14/2026. Approval letter uploaded to patient's chart. -KNC,LPN

## 2025-07-14 NOTE — PATIENT INSTRUCTIONS
Begin taking Trulance 3 mg daily.   We will send referral to Dr. Hodgson for hemorrhoids.  Schedule colonoscopy with Dr. Garcia.     If any tests, procedures, or imaging has been ordered and you are not contacted to schedule within 1-2 weeks, then you may call the central scheduling department directly at [phone].   Please notify my office if you have not been contacted within two weeks after any procedures, submitting any samples (biopsies, blood, stool, urine, etc.) or after any imaging (X-ray, CT, MRI, etc.).

## 2025-07-14 NOTE — LETTER
July 14, 2025        Grady Cotton MD  [address]  [address]  [facility]  [place] [address]             [place] [facility]  [address] [address]  Phone: [phone]  Fax: [phone]   Patient: Faiza Tony   MR Number: [id number]   YOB: 1990   Date of Visit: 7/14/2025       Dear Dr. Cotton:    Thank you for referring Faiza Tony to me for evaluation. Attached you will find relevant portions of my assessment and plan of care.    If you have questions, please do not hesitate to call me. I look forward to following Faiza Tony along with you.    Sincerely,      Riya Albarran, NP            CC  No Recipients    Enclosure

## 2025-07-15 ENCOUNTER — TELEPHONE (OUTPATIENT)
Dept: SURGERY | Facility: CLINIC | Age: 35
End: 2025-07-15
Payer: COMMERCIAL

## 2025-07-15 NOTE — TELEPHONE ENCOUNTER
Copied from CRM #[id number]. Topic: Appointments - Appointment Access  >> Jul 15, 2025  3:03 PM Traci wrote:  Type:  Patient Requesting Call Back    Who Called:Faiza Kumarimilena  Does the patient know what this is regarding?:status of referral/appt- hemorrhoids  Would the patient rather a call back or a response via [employer]?   Best Call Back Number:[phone]  Additional Information: n/a  >> Jul 15, 2025  5:03 PM Med Assistant Randall wrote:  Pt scheduled for 7/16

## 2025-07-16 ENCOUNTER — OFFICE VISIT (OUTPATIENT)
Dept: SURGERY | Facility: CLINIC | Age: 35
End: 2025-07-16
Payer: COMMERCIAL

## 2025-07-16 DIAGNOSIS — K64.2 PROLAPSED INTERNAL HEMORRHOIDS, GRADE 3: Primary | ICD-10-CM

## 2025-07-16 DIAGNOSIS — K62.89 RECTAL PAIN: ICD-10-CM

## 2025-07-16 DIAGNOSIS — K64.9 HEMORRHOIDS, UNSPECIFIED HEMORRHOID TYPE: ICD-10-CM

## 2025-07-16 PROCEDURE — 99203 OFFICE O/P NEW LOW 30 MIN: CPT | Mod: S$PBB,,, | Performed by: SURGERY

## 2025-07-16 NOTE — PROGRESS NOTES
History & Physical    SUBJECTIVE:     History of Present Illness:    35-year-old female presents with proximally 10 day history of a painful right-sided hemorrhoid.  Patient states she has had hemorrhoidal issues over the last 13 or so years that occur intermittently and usually go away.  She has been fine over the last year and a half but over the last 10 days she has developed hemorrhoidal discomfort on right side.  States that when she has bowel movements it prolapses out but this time it we will not go back in.  She is using conservative measures with Anusol suppositories and cream as well as Sitz baths.  She does have a history of chronic constipation    Chief Complaint   Patient presents with    Other     Hemorrhoids          Review of patient's allergies indicates:  Review of patient's allergies indicates:  No Known Allergies    Medications Ordered Prior to Encounter[1]    Past Medical History:   Diagnosis Date    ADHD     BMI < 18.5 07/14/2025    Chronic constipation     History of Helicobacter pylori infection     Melanoma     eye    Unspecified hemorrhoids      Past Surgical History:   Procedure Laterality Date    AUGMENTATION OF BREAST      TONSILLECTOMY AND ADENOIDECTOMY       Family History   Problem Relation Name Age of Onset    Irritable bowel syndrome Maternal Uncle      Colon cancer Paternal Aunt  45    Colon cancer Paternal Uncle  46    Stomach cancer Maternal Grandmother      Stomach cancer Maternal Grandfather      Pancreatic cancer Paternal Grandmother      Stomach cancer Paternal Grandmother      Colon cancer Paternal Grandfather      Liver cancer Neg Hx      Liver disease Neg Hx      Esophageal cancer Neg Hx      Throat cancer Neg Hx      Ulcerative colitis Neg Hx      Crohn's disease Neg Hx         Social History[2]       Review of Systems   Constitutional: Negative.    Respiratory: Negative.     Cardiovascular: Negative.    Gastrointestinal:  Positive for constipation.   Genitourinary:  Negative.    Musculoskeletal: Negative.    Neurological: Negative.    Endo/Heme/Allergies: Negative.    Psychiatric/Behavioral: Negative.         OBJECTIVE:     There were no vitals filed for this visit.              Physical Exam:  Physical Exam  Exam conducted with a chaperone present.   Constitutional:       Appearance: Normal appearance.   HENT:      Head: Normocephalic and atraumatic.   Eyes:      Extraocular Movements: Extraocular movements intact.      Pupils: Pupils are equal, round, and reactive to light.   Cardiovascular:      Rate and Rhythm: Normal rate and regular rhythm.   Pulmonary:      Effort: Pulmonary effort is normal.      Breath sounds: Normal breath sounds.   Genitourinary:         Comments: Patient examined in the left lateral decubitus position.  Three hemorrhoidal piles were noted with external hemorrhoids on the left and anteriorly on right.  There is a prolapsed internal external hemorrhoid posteriorly on the right side that is inflamed and tender.  It is firm and likely has some clot.  Musculoskeletal:         General: Normal range of motion.      Cervical back: Normal range of motion.   Skin:     General: Skin is warm and dry.   Neurological:      General: No focal deficit present.      Mental Status: She is alert and oriented to person, place, and time.   Psychiatric:         Mood and Affect: Mood normal.         Behavior: Behavior normal.             ASSESSMENT/PLAN:   Prolapsed internal hemorrhoid with thrombosis right posterior anal canal.  No improvement with conservative measures  PLAN:  Discussed with and recommend hemorrhoidectomy.  I discussed that we would not address the other 2 hemorrhoidal piles at this time since they are asymptomatic.  We will plan for excisional hemorrhoidectomy on right posterior anal canal hemorrhoidal tissue.  We discussed the postoperative course and care.  Surgery scheduled for July 22nd               [1]   Current Outpatient Medications on File  Prior to Visit   Medication Sig Dispense Refill    dextroamphetamine-amphetamine 30 mg Tab Take 30 mg by mouth 2 (two) times a day.      diazePAM (VALIUM) 10 MG Tab TAKE 1 TABLET ONCE DAILY ASNEEDED FOR ANXIETY      hydrocortisone (ANUSOL-HC) 25 mg suppository INSERT 1 SUPPOSITORY INTO RECTUM TWICE A DAY FOR 1 TO 2 WEEKS FOR FLAIR UPS.      montelukast (SINGULAIR) 10 mg tablet Take 10 mg by mouth.      plecanatide (TRULANCE) 3 mg Tab Take 1 tablet (3 mg total) by mouth once daily. 90 tablet 1    plecanatide (TRULANCE) 3 mg Tab Take 1 tablet (3 mg total) by mouth once daily. 15 tablet 0    tiZANidine (ZANAFLEX) 4 MG tablet TAKE 1 TABLET 3 TIMES A DAYAS NEEDED FOR MUSCLE SPASMS       No current facility-administered medications on file prior to visit.   [2]   Social History  Socioeconomic History    Marital status:    Tobacco Use    Smoking status: Every Day     Current packs/day: 0.50     Types: Cigarettes    Smokeless tobacco: Never   Substance and Sexual Activity    Alcohol use: Yes     Alcohol/week: 6.0 standard drinks of alcohol     Types: 6 Cans of beer per week    Drug use: Never

## 2025-07-22 ENCOUNTER — OUTSIDE PLACE OF SERVICE (OUTPATIENT)
Dept: SURGERY | Facility: CLINIC | Age: 35
End: 2025-07-22

## 2025-07-28 ENCOUNTER — OFFICE VISIT (OUTPATIENT)
Dept: SURGERY | Facility: CLINIC | Age: 35
End: 2025-07-28
Payer: COMMERCIAL

## 2025-07-28 DIAGNOSIS — Z98.890 POST-OPERATIVE STATE: Primary | ICD-10-CM

## 2025-07-28 NOTE — PROGRESS NOTES
HPI:  Postop revisit status post internal hemorrhoidectomy.  Seems to be doing well.  Still complains of a little bit of pain with bowel movements as well as some slight bleeding after bowel movements but nothing too terrible.    PHYSICAL EXAM:  Hemorrhoidectomy site appears to be healing well.  There is slight swelling around the edges as expected.  ASSESSMENT:    Stable status post hemorrhoidectomy doing well  PLAN:      Discussed with patient that it will take 2-3 weeks to totally heal.  She is doing fine and to revisit as needed